# Patient Record
(demographics unavailable — no encounter records)

---

## 2025-05-13 NOTE — HISTORY OF PRESENT ILLNESS
[FreeTextEntry1] : cc- TLE f/u  66 y/o woman with RTLE with seizures well controlled since menopause. No auras or seizures since last visit one year ago. No seizures in > 10 yrs.  Has chronic right ear hearing loss but no recent vertigo.  MRI- R HC sclerosis and two right convexity meningiomas- anterior one with 3mm of growth in two years- no edema or significant cortical compression  Two sisters diagnosed with brain tumors.  meds- Keppra 250 bid Bistolic olmesartan Benicar

## 2025-05-13 NOTE — PHYSICAL EXAM
[FreeTextEntry1] :    Neuro exam  Alert and oriented x3 lang and STM wnl CN intact in detail Motor 5/5 Dtrs sl adductor spread Sensory intact in detail CSG wnl .

## 2025-05-13 NOTE — ASSESSMENT
[FreeTextEntry1] : a/p- 1. 66 y/o with TLE , now seizure free for greater than 10 yrs - continue Levetiracetam at 250 bid  2.Two small right convexity meningiomas -the anterior lesion grew 3mm in two yrs. schedule MRI brain early 26 for f/u  RTC 1 yr.